# Patient Record
Sex: MALE | Race: BLACK OR AFRICAN AMERICAN | Employment: STUDENT | URBAN - METROPOLITAN AREA
[De-identification: names, ages, dates, MRNs, and addresses within clinical notes are randomized per-mention and may not be internally consistent; named-entity substitution may affect disease eponyms.]

---

## 2022-03-05 ENCOUNTER — HOSPITAL ENCOUNTER (OUTPATIENT)
Dept: MRI IMAGING | Age: 22
Discharge: HOME | End: 2022-03-05
Attending: ORTHOPAEDIC SURGERY
Payer: COMMERCIAL

## 2022-03-05 DIAGNOSIS — S93.421A TEAR OF DELTOID LIGAMENT OF RIGHT ANKLE, INITIAL ENCOUNTER: ICD-10-CM

## 2022-03-05 DIAGNOSIS — S82.51XA CLOSED DISPLACED FRACTURE OF MEDIAL MALLEOLUS OF RIGHT TIBIA, INITIAL ENCOUNTER: ICD-10-CM

## 2022-03-05 PROCEDURE — 73721 MRI JNT OF LWR EXTRE W/O DYE: CPT

## 2022-08-08 ENCOUNTER — OFFICE VISIT (OUTPATIENT)
Dept: ORTHOPEDIC SURGERY | Age: 22
End: 2022-08-08
Payer: OTHER GOVERNMENT

## 2022-08-08 DIAGNOSIS — S62.323A CLOSED DISPLACED FRACTURE OF SHAFT OF THIRD METACARPAL BONE OF LEFT HAND, INITIAL ENCOUNTER: Primary | ICD-10-CM

## 2022-08-08 DIAGNOSIS — M79.642 LEFT HAND PAIN: ICD-10-CM

## 2022-08-08 PROCEDURE — 99214 OFFICE O/P EST MOD 30 MIN: CPT | Performed by: ORTHOPAEDIC SURGERY

## 2022-08-08 RX ORDER — IBUPROFEN 200 MG
1 CAPSULE ORAL DAILY
Qty: 42 TABLET | Refills: 0 | Status: SHIPPED | OUTPATIENT
Start: 2022-08-08 | End: 2022-09-19

## 2022-08-08 RX ORDER — ERGOCALCIFEROL (VITAMIN D2) 1250 MCG
50000 CAPSULE ORAL
Qty: 12 CAPSULE | Refills: 0 | Status: SHIPPED | OUTPATIENT
Start: 2022-08-08 | End: 2022-09-19

## 2022-08-08 NOTE — PROGRESS NOTES
Name: Aliza Rivera  YOB: 2000  Gender: male  MRN: [id number]      CC: Injury (L hand injury)       HPI: Aliza Rivera is a 24 y.o. 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 He describes extreme hyperextension of his finger that is now causing pain when he wiggles his fingers and when he tries to extend his fingers. He says this is an acute injury that occurred today and therefore has not done anything to treat it aside from a splint that his ATC put on him. He is right-hand dominant and plays defensive end. ROS/Meds/PSH/PMH/FH/SH: I personally reviewed the patients standard intake form. Below are the pertinents    Tobacco:  reports that he has never smoked. He has never used smokeless tobacco.  Diabetes: none  Other: none    Physical Examination:  General: no acute distress  Lungs: breathing easily  CV: regular rhythm by pulse  Left hand : Focal swelling and tenderness palpation over the midshaft and proximal aspect of the third metacarpal.  Nontender over the PIP of the MCP or the DIP. No wrist tenderness. Motor and sensory intact. He has no rotational deformity upon active flexion or extension of his third digit. FDS FDP and EDC are intact. Imaging:   Left hand XR: AP, Lateral, Oblique views     Clinical Indication    ICD-10-CM    1. Closed displaced fracture of shaft of third metacarpal bone of left hand, initial encounter  S62.323A       2. Left hand pain  M79.642 XR HAND LEFT (MIN 3 VIEWS)             Report: AP, lateral, oblique x-ray of the left hand demonstrates oblique fracture of the proximal third of the shaft of the third metacarpal with minimal displacement or angulation. Impression: Third metacarpal fracture as above     Jody Stroud MD      All imaging interpreted by myself Zoltan Carey MD independent of radiology review        Assessment:     ICD-10-CM    1. Closed displaced fracture of shaft of third metacarpal bone of left hand, initial encounter  S62.323A       2. Left hand pain  M79.642 XR HAND LEFT (MIN 3 VIEWS)          Plan: Third metacarpal fracture with minimal angulation and displacement. We will plan to treat this nonoperatively. He was placed into a short arm cast today that encompassed the entire metacarpal and we jose eduardo taped his long finger to his index finger. We will plan to repeat x-rays in 3 weeks. If he has some early healing and callus we may consider letting him return to contact in a cast.              Yenifer Arroyo.  J Luis Chery MD, [address] [facility] and Sports Medicine

## 2022-08-25 ENCOUNTER — OFFICE VISIT (OUTPATIENT)
Dept: ORTHOPEDIC SURGERY | Age: 22
End: 2022-08-25
Payer: OTHER GOVERNMENT

## 2022-08-25 DIAGNOSIS — S62.323D CLOSED DISPLACED FRACTURE OF SHAFT OF THIRD METACARPAL BONE OF LEFT HAND WITH ROUTINE HEALING, SUBSEQUENT ENCOUNTER: Primary | ICD-10-CM

## 2022-08-25 PROCEDURE — 99213 OFFICE O/P EST LOW 20 MIN: CPT | Performed by: ORTHOPAEDIC SURGERY

## 2022-08-25 NOTE — PROGRESS NOTES
Name: Eddie Tafoya  YOB: 2000  Gender: male  MRN: [id number]      CC: Follow-up (Left hand recheck)       HPI: Eddie Tafoya is a 24 y.o. male who returns for follow up on his left hand. He is 3 weeks out from a 3rd metacarpal fracture. He has minimal pain. Physical Examination:  General: no acute distress  Lungs: breathing easily  CV: regular rhythm by pulse  Left hand : Mild tenderness palpation over the fracture site but less than previously. He is able to get terminal extension with mild discomfort. Full flexion as well. No malrotation. Motor and sensory intact. Left hand XR: AP, Lateral, Oblique views     Clinical Indication    ICD-10-CM    1. Closed displaced fracture of shaft of third metacarpal bone of left hand with routine healing, subsequent encounter  S62.323D XR HAND LEFT (MIN 3 VIEWS)     CANCELED: XR HAND LEFT (MIN 3 VIEWS)             Report: AP, lateral, oblique x-ray of the Left hand demonstrates early callus formation without interval displacement of the proximal third metacarpal fracture    Impression: Stable fracture as above with progression of healing     Carolyn Dawn MD          Assessment:     ICD-10-CM    1. Closed displaced fracture of shaft of third metacarpal bone of left hand with routine healing, subsequent encounter  S62.323D XR HAND LEFT (MIN 3 VIEWS)     CANCELED: XR HAND LEFT (MIN 3 VIEWS)          Plan:   Fracture continues to progress with healing. We discussed a gradual return to play in the cast.  I would like for him to stay in the cast full-time for 1 more week. At that point we can start coming out of the cast to begin some range of motion but continue to wear the cast for playing until I see him back for follow-up in about 2-1/2 weeks with repeat x-rays. Bishop Bertin Hooker MD, [facility] and Sports Medicine

## 2022-09-13 ENCOUNTER — OFFICE VISIT (OUTPATIENT)
Dept: ORTHOPEDIC SURGERY | Age: 22
End: 2022-09-13
Payer: OTHER GOVERNMENT

## 2022-09-13 DIAGNOSIS — S62.323D CLOSED DISPLACED FRACTURE OF SHAFT OF THIRD METACARPAL BONE OF LEFT HAND WITH ROUTINE HEALING, SUBSEQUENT ENCOUNTER: Primary | ICD-10-CM

## 2022-09-13 PROCEDURE — 99213 OFFICE O/P EST LOW 20 MIN: CPT | Performed by: ORTHOPAEDIC SURGERY

## 2022-09-13 NOTE — PROGRESS NOTES
Name: James Fierro  YOB: 2000  Gender: male  MRN: [id number]      CC: Follow-up (Left hand)       HPI: James Fierro is a 24 y.o. male who returns for follow up on his left third metacarpal fracture. His cast was removed over the weekend by the athletic training staff. He is not having significant pain since he has been out of the cast.        Physical Examination:  General: no acute distress  Lungs: breathing easily  CV: regular rhythm by pulse  Left hand : Palpable callus over the proximal third metacarpal but no tenderness to palpation. Full flexion and extension without extensor lag. No malrotation upon flexion    Imaging:  Left hand XR: AP, Lateral, Oblique views     Clinical Indication    ICD-10-CM    1. Closed displaced fracture of shaft of third metacarpal bone of left hand with routine healing, subsequent encounter  S62.323D XR HAND LEFT (MIN 3 VIEWS)             Report: AP, lateral, oblique x-ray of the left hand demonstrates abundant callus formation volarly and distally with visible fracture line still present over the proximal dorsal aspect of the fracture with no interval displacement. Impression: Progression of healing third metacarpal fracture as above     Judit Gage MD        Assessment:     ICD-10-CM    1. Closed displaced fracture of shaft of third metacarpal bone of left hand with routine healing, subsequent encounter  S62.323D XR HAND LEFT (MIN 3 VIEWS)          Plan:   Progression of healing. We will leave him jose eduardo taped with range of motion as tolerated when he is not playing football. When he is playing football he will be in a soft cast on the dorsum of the metacarpal to protect the fracture. I will see him back in 4 weeks with repeat x-rays. Shanell Decker MD, [address] [place] [address] and Sports Medicine

## 2022-10-18 ENCOUNTER — OFFICE VISIT (OUTPATIENT)
Dept: ORTHOPEDIC SURGERY | Age: 22
End: 2022-10-18
Payer: OTHER GOVERNMENT

## 2022-10-18 DIAGNOSIS — S62.323D CLOSED DISPLACED FRACTURE OF SHAFT OF THIRD METACARPAL BONE OF LEFT HAND WITH ROUTINE HEALING, SUBSEQUENT ENCOUNTER: Primary | ICD-10-CM

## 2022-10-18 PROCEDURE — 99213 OFFICE O/P EST LOW 20 MIN: CPT | Performed by: ORTHOPAEDIC SURGERY

## 2022-10-18 NOTE — PROGRESS NOTES
Name: Harpal Bone  YOB: 2000  Gender: male  MRN: [id number]      CC: Follow-up (L 3rd metacarpal fx)       HPI: Harpal Bone is a 24 y.o. male who returns for follow up on his left 3rd metacarpal fracture. He is 2.5 months from his initial date of injury. He has no significant pain        Physical Examination:  General: no acute distress  Lungs: breathing easily  CV: regular rhythm by pulse  Left hand : Nontender over the fracture site. There is palpable callus. He has full passive and active range of motion with no extensor lag or malrotation. Imaging:  Left hand XR: AP, Lateral, Oblique views     Clinical Indication    ICD-10-CM    1. Closed displaced fracture of shaft of third metacarpal bone of left hand with routine healing, subsequent encounter  S62.323D XR HAND LEFT (MIN 3 VIEWS)             Report: AP, lateral, oblique x-ray of the Left hand demonstrates complete fracture union with abundant callus formation of the third metacarpal fracture    Impression: Healed fracture as above     Marcello Thakkar MD            Assessment:     ICD-10-CM    1. Closed displaced fracture of shaft of third metacarpal bone of left hand with routine healing, subsequent encounter  S62.323D XR HAND LEFT (MIN 3 VIEWS)          Plan:   Healed fracture he can advance his activities as tolerated follow-up with us as needed            Zoltan Clements MD, [address] [place] [address] and Sports Medicine